# Patient Record
Sex: FEMALE | Race: WHITE | URBAN - METROPOLITAN AREA
[De-identification: names, ages, dates, MRNs, and addresses within clinical notes are randomized per-mention and may not be internally consistent; named-entity substitution may affect disease eponyms.]

---

## 2021-05-21 ENCOUNTER — EMERGENCY (EMERGENCY)
Facility: HOSPITAL | Age: 71
LOS: 1 days | Discharge: HOME | End: 2021-05-21
Attending: EMERGENCY MEDICINE | Admitting: EMERGENCY MEDICINE
Payer: MEDICARE

## 2021-05-21 VITALS
SYSTOLIC BLOOD PRESSURE: 98 MMHG | RESPIRATION RATE: 16 BRPM | WEIGHT: 154.98 LBS | TEMPERATURE: 98 F | HEART RATE: 105 BPM | OXYGEN SATURATION: 96 % | DIASTOLIC BLOOD PRESSURE: 70 MMHG | HEIGHT: 63 IN

## 2021-05-21 DIAGNOSIS — M25.512 PAIN IN LEFT SHOULDER: ICD-10-CM

## 2021-05-21 DIAGNOSIS — S42.202A UNSPECIFIED FRACTURE OF UPPER END OF LEFT HUMERUS, INITIAL ENCOUNTER FOR CLOSED FRACTURE: ICD-10-CM
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
## 2021-05-21 PROCEDURE — 99284 EMERGENCY DEPT VISIT MOD MDM: CPT

## 2021-05-21 RX ORDER — IBUPROFEN 200 MG
600 TABLET ORAL ONCE
Refills: 0 | Status: COMPLETED | OUTPATIENT
Start: 2021-05-21 | End: 2021-05-21

## 2021-05-21 RX ADMIN — Medication 600 MILLIGRAM(S): at 13:01

## 2021-05-21 NOTE — ED ADULT TRIAGE NOTE - CHIEF COMPLAINT QUOTE
Pt sent from  for eval of Lt humeral head fracture. Pt reports mechanical fall on Sunday. Lt arm with hematoma and loss of rom.

## 2021-05-21 NOTE — ED PROVIDER NOTE - NSFOLLOWUPINSTRUCTIONS_ED_ALL_ED_FT
Humerus Fracture Treated With Immobilization       A humerus fracture is a break in the large bone in the upper arm (humerus). If the joint is stable and the bones are still in their normal position (nondisplaced), the injury may be treated with immobilization. This involves the use of a cast, splint, or sling to hold your arm in place. Immobilization ensures that your bones continue to stay in the correct position while your arm is healing.      What are the causes?  This condition may be caused by:  •A fall.      •A hard, direct hit to the arm.      •A motor vehicle accident.        What increases the risk?  The following factors may make you more likely to develop this condition:  •Being elderly.      •Having a disease that makes the bones thin and weak.        What are the signs or symptoms?  Symptoms of this condition include:  •Pain.      •Swelling.      •Bruising.      •Not being able to move your arm normally.        How is this diagnosed?  This condition may be diagnosed based on:  •A physical exam.      •X-rays of your upper arm, elbow, and shoulder.      •CT scan.        How is this treated?    Treatment for this condition involves wearing a cast, splint, or sling until the injured area is stable enough for you to begin range-of-motion exercises. You may also be prescribed pain medicine.      Follow these instructions at home:    If you have a cast:     • Do not stick anything inside the cast to scratch your skin. Doing that increases your risk of infection.      •Check the skin around the cast every day. Tell your health care provider about any concerns.      •You may put lotion on dry skin around the edges of the cast. Do not put lotion on the skin underneath the cast.      •Keep the cast clean and dry.      If you have a splint or sling:     •Wear the splint or sling as told by your health care provider. Remove it only as told by your health care provider.      •Loosen the splint or sling if your fingers tingle, become numb, or turn cold and blue.      •Keep the splint or sling clean and dry.      Bathing     • Do not take baths, swim, or use a hot tub until your health care provider approves. Ask your health care provider if you may take showers. You may only be allowed to take sponge baths.    •If the cast, splint, or sling is not waterproof:  •Do not let it get wet.      •Cover it with a watertight covering when you take a bath or shower.        •If you have a sling, remove it for bathing only if your health care provider tells you that it is safe to do that.        Managing pain, stiffness, and swelling    •If directed, put ice on the injured area.  •If you have a removable splint or sling, remove it as told by your health care provider.      •Put ice in a plastic bag.      •Place a towel between your skin and the bag or between your cast and the bag.      •Leave the ice on for 20 minutes, 2–3 times a day.        •Move your fingers often to reduce stiffness and swelling.      •Raise (elevate) the injured area above the level of your heart while you are sitting or lying down.      Driving     • Do not drive or use heavy machinery while taking prescription pain medicine.      • Do not drive while wearing a cast, splint, or sling on an arm that you use for driving. Ask your health care provider when it is safe to drive.      Activity     •Return to your normal activities as told by your health care provider. Ask your health care provider what activities are safe for you.      • Do not lift anything until your health care provider says that it is safe.      •Do range-of-motion exercises only as told by your health care provider or physical therapist.      General instructions     • Do not put pressure on any part of the cast or splint until it is fully hardened. This may take several hours.      • Do not use any products that contain nicotine or tobacco, such as cigarettes, e-cigarettes, and chewing tobacco. These can delay bone healing. If you need help quitting, ask your health care provider.      •Take over-the-counter and prescription medicines only as told by your health care provider.    •Ask your health care provider if the medicine prescribed to you can cause constipation. You may need to take steps to prevent or treat constipation, such as:  •Drink enough fluid to keep your urine pale yellow.      •Take over-the-counter or prescription medicines.      •Eat foods that are high in fiber, such as beans, whole grains, and fresh fruits and vegetables.      •Limit foods that are high in fat and processed sugars, such as fried or sweet foods.        •Keep all follow-up visits as told by your health care provider. This is important.        Contact a health care provider if:    •You have any new pain, swelling, or bruising.      •Your pain, swelling, and bruising do not improve.      •Your cast, splint, or sling becomes loose or damaged.        Get help right away if:    •Your skin or fingers on your injured arm turn blue or gray.      •Your arm feels cold or numb.      •You have severe pain in your injured arm.        Summary    •A humerus fracture is a break in the large bone in the upper arm.      •Immobilization involves the use of a cast, splint, or sling to hold your arm in place while the injury heals.      •Wear a splint or sling as told by your health care provider. Remove it only as told by your health care provider.      •Move your fingers often to reduce stiffness and swelling.      This information is not intended to replace advice given to you by your health care provider. Make sure you discuss any questions you have with your health care provider.

## 2021-05-21 NOTE — ED PROVIDER NOTE - CLINICAL SUMMARY MEDICAL DECISION MAKING FREE TEXT BOX
Pt's sling was inappropriately placed -- fixed sling, instructed pt how to wear in the future, given anticipatory guidance and return precautions. f/u with ortho, c/w supportive care measures. Xrays evaluated by me via CD brought by pt - no dislocation, + humeral head fx w/mild displacement. Suitable for outpt f/u.

## 2021-05-21 NOTE — ED PROVIDER NOTE - PATIENT PORTAL LINK FT
You can access the FollowMyHealth Patient Portal offered by [facility] by registering at the following website: [email or web address]. By joining [facility]’s FollowMyHealth portal, you will also be able to view your health information using other applications (apps) compatible with our system.

## 2021-05-21 NOTE — ED PROVIDER NOTE - CARE PROVIDER_API CALL
Tuberculin Skin Test: Care Instructions  Your Care Instructions    Tuberculosis (TB) is a bacterial infection that can damage the lungs or other parts of the body. The TB skin test can tell if you have TB bacteria in your body. Many people are exposed to TB and test positive for TB bacteria in their bodies, but they don't get the disease. TB bacteria can stay in your body without making you sick. This is because your immune system can keep TB in check. Your doctor may want you to have a TB skin test if you have been in close contact with someone who has TB. Or you may need the test if you have symptoms that might be caused by TB, such as a cough that does not go away, a fever, or weight loss. You also may get the test if you are a health care worker. During the skin test, part of a TB bacterium is injected under your skin. The test will feel like a skin prick. If you have TB bacteria in your body, a firm red bump will form at the shot site within 2 days. If the test shows that you are infected with TB (positive), your doctor probably will order more tests. A TB-positive skin test can't tell when you became infected with TB. And it can't tell whether the infection can be passed to others. Follow-up care is a key part of your treatment and safety. Be sure to make and go to all appointments, and call your doctor if you are having problems. It's also a good idea to know your test results and keep a list of the medicines you take. How can you care for yourself at home? · Do not scratch the test site. Scratching it may cause redness or swelling. This could affect the test results. · To ease itching, put a cold washcloth on the site. Then pat the site dry. · Do not cover the test site with a bandage or other dressing. · Go back to your doctor's office or hospital to have the test read on the follow-up date. This must be done between 48 and 72 hours after you get the shot. When should you call for help?   Watch closely for changes in your health, and be sure to contact your doctor if you have any problems. Where can you learn more? Go to http://[facility].info/. Enter (10) [phone] in the search box to learn more about \"Tuberculin Skin Test: Care Instructions. \"  Current as of: March 3, 2017  Content Version: 11.4  © 2503-7017 [employer]. Care instructions adapted under license by "[employer] (which disclaims liability or warranty for this information). If you have questions about a medical condition or this instruction, always ask your healthcare professional. [place] [address] any warranty or liability for your use of this information. Cedric Redman)  Orthopaedic Surgery  [address] [place] [address]  Phone: [phone]  Fax: [phone]  Follow Up Time:

## 2021-05-21 NOTE — ED ADULT NURSE NOTE - OBJECTIVE STATEMENT
Patient to ED for left shoulder pain s/p mechanical fall 6 days ago w/significant bruising/swelling, [place] [facility] [place] of L humerus, shoulder, and ribs w/findings of fx of the L humeral head, placed in sling and sent to the ED. Pain well controlled with tylenol. Shoulder immobilized. No c/o

## 2021-05-21 NOTE — ED PROVIDER NOTE - PHYSICAL EXAMINATION
VITAL SIGNS: I have reviewed nursing notes and confirm.  CONSTITUTIONAL: Well-developed; well-nourished; in no acute distress.  SKIN: Skin exam is warm and dry, no acute rash.  HEAD: Normocephalic; atraumatic.  EYES: PERRL, EOM intact; conjunctiva and sclera clear.  ENT: No nasal discharge; airway clear.  NECK: Supple; non tender.  CARD: Regular rate and rhythm.  RESP: Unlabored. No wheezes, rales or rhonchi.  ABD: soft; non-distended; non-tender  EXT: + L shoulder TTP w/limited ROM w/surrounding bruising in various stages of healing full L upper arm, distal pulses intact,  strength symmetric, sensation intact, no TTP elbow to fingertips. all other ext exam wnl  NEURO: Alert, oriented. Grossly unremarkable.  PSYCH: Cooperative, appropriate.

## 2021-05-21 NOTE — ED PROVIDER NOTE - OBJECTIVE STATEMENT
69 y/o F p/w L shoulder pain s/p mechanical fall 6 days ago w/significant bruising/swelling, seen at [facility] [place] w/xrays performed of L humerus, shoulder, and ribs w/findings of fx of the L humeral head, placed in sling and sent to the ED. Pain well controlled with tylenol. Shoulder immobilized. No c/o numbness/tingling, reports having full function of her L hand (R hand dominant). Mostly wondering why she was sent to the ED. No head injury, no HA, LOC, n/v or neck pain.

## 2021-06-08 ENCOUNTER — OUTPATIENT (OUTPATIENT)
Dept: OUTPATIENT SERVICES | Facility: HOSPITAL | Age: 71
LOS: 1 days | End: 2021-06-08
Payer: MEDICARE

## 2021-06-08 ENCOUNTER — APPOINTMENT (OUTPATIENT)
Dept: RADIOLOGY | Facility: CLINIC | Age: 71
End: 2021-06-08

## 2021-06-08 PROCEDURE — 73060 X-RAY EXAM OF HUMERUS: CPT | Mod: 26,LT

## 2021-07-06 ENCOUNTER — OUTPATIENT (OUTPATIENT)
Dept: OUTPATIENT SERVICES | Facility: HOSPITAL | Age: 71
LOS: 1 days | End: 2021-07-06
Payer: MEDICARE

## 2021-07-06 ENCOUNTER — RESULT REVIEW (OUTPATIENT)
Age: 71
End: 2021-07-06

## 2021-07-06 ENCOUNTER — APPOINTMENT (OUTPATIENT)
Dept: RADIOLOGY | Facility: CLINIC | Age: 71
End: 2021-07-06

## 2021-07-06 PROCEDURE — 73060 X-RAY EXAM OF HUMERUS: CPT | Mod: 26,LT
